# Patient Record
Sex: MALE | Race: WHITE | NOT HISPANIC OR LATINO | Employment: OTHER | URBAN - METROPOLITAN AREA
[De-identification: names, ages, dates, MRNs, and addresses within clinical notes are randomized per-mention and may not be internally consistent; named-entity substitution may affect disease eponyms.]

---

## 2024-08-31 ENCOUNTER — OFFICE VISIT (OUTPATIENT)
Age: 34
End: 2024-08-31
Payer: COMMERCIAL

## 2024-08-31 VITALS
HEART RATE: 61 BPM | WEIGHT: 196 LBS | SYSTOLIC BLOOD PRESSURE: 122 MMHG | HEIGHT: 69 IN | TEMPERATURE: 97.9 F | BODY MASS INDEX: 29.03 KG/M2 | DIASTOLIC BLOOD PRESSURE: 81 MMHG | RESPIRATION RATE: 18 BRPM | OXYGEN SATURATION: 97 %

## 2024-08-31 DIAGNOSIS — B96.89 ACUTE BACTERIAL RHINOSINUSITIS: Primary | ICD-10-CM

## 2024-08-31 DIAGNOSIS — J01.90 ACUTE BACTERIAL RHINOSINUSITIS: Primary | ICD-10-CM

## 2024-08-31 DIAGNOSIS — H10.33 ACUTE BACTERIAL CONJUNCTIVITIS OF BOTH EYES: ICD-10-CM

## 2024-08-31 DIAGNOSIS — R68.89 FLU-LIKE SYMPTOMS: ICD-10-CM

## 2024-08-31 PROBLEM — S62.627B: Status: ACTIVE | Noted: 2024-01-03

## 2024-08-31 LAB
SARS-COV-2 AG UPPER RESP QL IA: NEGATIVE
VALID CONTROL: NORMAL

## 2024-08-31 PROCEDURE — G0382 LEV 3 HOSP TYPE B ED VISIT: HCPCS

## 2024-08-31 PROCEDURE — 87811 SARS-COV-2 COVID19 W/OPTIC: CPT

## 2024-08-31 RX ORDER — OFLOXACIN 3 MG/ML
2 SOLUTION/ DROPS OPHTHALMIC 4 TIMES DAILY
Qty: 5 ML | Refills: 0 | Status: SHIPPED | OUTPATIENT
Start: 2024-08-31

## 2024-08-31 RX ORDER — FLUTICASONE PROPIONATE 50 MCG
1 SPRAY, SUSPENSION (ML) NASAL DAILY
Qty: 9.9 ML | Refills: 0 | Status: SHIPPED | OUTPATIENT
Start: 2024-08-31

## 2024-08-31 RX ORDER — BROMPHENIRAMINE MALEATE, PSEUDOEPHEDRINE HYDROCHLORIDE, AND DEXTROMETHORPHAN HYDROBROMIDE 2; 30; 10 MG/5ML; MG/5ML; MG/5ML
5 SYRUP ORAL 4 TIMES DAILY PRN
Qty: 120 ML | Refills: 0 | Status: SHIPPED | OUTPATIENT
Start: 2024-08-31

## 2024-08-31 NOTE — PROGRESS NOTES
[facility]        NAME: Gavin Weber is a 33 y.o. male  : 1990    MRN: [id number]  DATE: 2024  TIME: 10:46 AM    Assessment and Plan   Acute bacterial rhinosinusitis [J01.90, B96.89]  1. Acute bacterial rhinosinusitis  amoxicillin-clavulanate (AUGMENTIN) 875-125 mg per tablet      2. Flu-like symptoms  Poct Covid 19 Rapid Antigen Test    fluticasone (FLONASE) 50 mcg/act nasal spray      3. Acute bacterial conjunctivitis of both eyes  ofloxacin (OCUFLOX) 0.3 % ophthalmic solution        Rapid COVID test negative. Patient declined Strep testing. Antibiotics as directed for presumed bacterial infection. Bromphed and flonase as directed for cough/congestion. Ocuflox for conjunctivitis. VSS in clinic, appears in no acute distress. Educated on use of OTC products for additional relief of symptoms. Advised close follow-up with PCP or to report to the ER if symptoms worsen. Patient verbalizes understanding and agreeable to plan.       Patient Instructions     Take antibiotic as directed for next week. Complete entire course of therapy even if symptoms improve and take medication with food to avoid upset stomach. Use flonase with nasal saline up to twice daily for congestion and take bromphed as directed for cough.    Apply drops to affected eye(s) as directed the next 7 days. Apply warm compresses after administering drop for additional relief of symptoms. Wash hands frequently and avoid touching eyes. Change bed linen after 24 hours of using drops. May use oral (zyrtec, benadryl, claritin) or nasal (flonase) decongestants as needed for congestion. Follow-up with PCP in 3-5 days if no improvement of symptoms. Report to ER if symptoms worsen.       Chief Complaint     Chief Complaint   Patient presents with    Cold Like Symptoms     Started 6 days ago, patient complains of cough, congestion, headache, discharge of left eye.          History of Present Illness       33 year old male presents  for evaluation of congestion for the past 6 days. He relates he was around family members last week with similar symptoms, whose symptoms went away, but his have persisted. He denies h/o asthma or allergies. He denies any known fevers. He reports a mild cough with occasional productive white mucous. He denies SOB. He relates he originally had a sore throat but that has since resolved. He also reports waking up the past few mornings with green/yellow crusting discharge from both eyes. He denies associated visual disturbances, headaches, or dizziness. He has been taking dayquil, nyquil, and mucinex for symptoms with minimal relief.     Sinusitis  This is a new problem. The current episode started in the past 7 days. The problem is unchanged. There has been no fever. The pain is moderate. Associated symptoms include congestion, coughing, sinus pressure and a sore throat. Pertinent negatives include no chills, diaphoresis, ear pain, headaches, hoarse voice, neck pain, shortness of breath, sneezing or swollen glands. Past treatments include oral decongestants. The treatment provided mild relief.   Conjunctivitis   The current episode started 2 days ago. The onset was sudden. The problem has been gradually worsening. The problem is mild. Nothing relieves the symptoms. Nothing aggravates the symptoms. Associated symptoms include eye itching, congestion, rhinorrhea, sore throat, cough, URI, eye discharge, eye pain and eye redness. Pertinent negatives include no orthopnea, no fever, no decreased vision, no double vision, no photophobia, no abdominal pain, no constipation, no diarrhea, no nausea, no vomiting, no ear discharge, no ear pain, no headaches, no hearing loss, no mouth sores, no stridor, no swollen glands, no muscle aches, no neck pain, no neck stiffness, no wheezing and no rash. The eye pain is mild. Both eyes are affected. The eye pain is not associated with movement. The eyelid exhibits swelling. He has been  Behaving normally. He has been Eating and drinking normally. Urine output has been normal. The last void occurred Less than 6 hours ago. There were sick contacts at home. He has received no recent medical care.       Review of Systems   Review of Systems   Constitutional:  Positive for activity change. Negative for appetite change, chills, diaphoresis, fatigue and fever.   HENT:  Positive for congestion, postnasal drip, rhinorrhea, sinus pressure and sore throat. Negative for ear discharge, ear pain, hearing loss, hoarse voice, mouth sores, sinus pain, sneezing and trouble swallowing.    Eyes:  Positive for pain, discharge, redness and itching. Negative for double vision, photophobia and visual disturbance.   Respiratory:  Positive for cough. Negative for chest tightness, shortness of breath, wheezing and stridor.    Cardiovascular:  Negative for chest pain and orthopnea.   Gastrointestinal:  Negative for abdominal pain, constipation, diarrhea, nausea and vomiting.   Musculoskeletal:  Negative for arthralgias, back pain, myalgias and neck pain.   Skin:  Negative for color change, pallor and rash.   Allergic/Immunologic: Negative for environmental allergies and food allergies.   Neurological:  Negative for dizziness, light-headedness and headaches.         Current Medications       Current Outpatient Medications:     amoxicillin-clavulanate (AUGMENTIN) 875-125 mg per tablet, Take 1 tablet by mouth every 12 (twelve) hours for 7 days, Disp: 14 tablet, Rfl: 0    fluticasone (FLONASE) 50 mcg/act nasal spray, 1 spray into each nostril daily, Disp: 9.9 mL, Rfl: 0    ofloxacin (OCUFLOX) 0.3 % ophthalmic solution, Administer 2 drops to both eyes 4 (four) times a day, Disp: 5 mL, Rfl: 0    Current Allergies     Allergies as of 08/31/2024 - Reviewed 08/31/2024   Allergen Reaction Noted    Meloxicam Itching 08/31/2024            The following portions of the patient's history were reviewed and updated as appropriate: allergies,  "current medications, past family history, past medical history, past social history, past surgical history and problem list.     History reviewed. No pertinent past medical history.    History reviewed. No pertinent surgical history.    No family history on file.      Medications have been verified.        Objective   /81   Pulse 61   Temp 97.9 °F (36.6 °C)   Resp 18   Ht 5' 9\" (1.753 m)   Wt 88.9 kg (196 lb)   SpO2 97%   BMI 28.94 kg/m²        Physical Exam     Physical Exam  Vitals and nursing note reviewed.   Constitutional:       General: He is awake. He is not in acute distress.     Appearance: Normal appearance. He is well-developed and overweight.   HENT:      Head: Normocephalic and atraumatic.      Right Ear: Hearing, tympanic membrane, ear canal and external ear normal.      Left Ear: Hearing, tympanic membrane, ear canal and external ear normal.      Nose: Congestion and rhinorrhea present.      Mouth/Throat:      Lips: Pink.      Mouth: Mucous membranes are moist.      Pharynx: Oropharynx is clear. Uvula midline. Posterior oropharyngeal erythema and postnasal drip present. No pharyngeal swelling, oropharyngeal exudate or uvula swelling.      Tonsils: No tonsillar exudate or tonsillar abscesses. 2+ on the right. 2+ on the left.   Eyes:      General:         Right eye: Discharge present.         Left eye: Discharge present.     Conjunctiva/sclera:      Right eye: Right conjunctiva is injected.      Left eye: Left conjunctiva is injected.      Comments: Yellow/crusty eye discharge per patient, not present on exam. Scelera red in both eyes, mild surrounding eyelid swelling of upper eyelids.   Cardiovascular:      Rate and Rhythm: Normal rate and regular rhythm.      Pulses: Normal pulses.      Heart sounds: Normal heart sounds.   Pulmonary:      Effort: Pulmonary effort is normal.      Breath sounds: Normal breath sounds.   Musculoskeletal:      Cervical back: Full passive range of motion " without pain, normal range of motion and neck supple.   Lymphadenopathy:      Cervical: No cervical adenopathy.   Skin:     General: Skin is warm and dry.   Neurological:      General: No focal deficit present.      Mental Status: He is alert and oriented to person, place, and time.   Psychiatric:         Mood and Affect: Mood normal.         Behavior: Behavior normal. Behavior is cooperative.         Thought Content: Thought content normal.         Judgment: Judgment normal.

## 2024-08-31 NOTE — PATIENT INSTRUCTIONS
Take antibiotic as directed for next week. Complete entire course of therapy even if symptoms improve and take medication with food to avoid upset stomach. Use flonase with nasal saline up to twice daily for congestion and take bromphed as directed for cough.    Apply drops to affected eye(s) as directed the next 7 days. Apply warm compresses after administering drop for additional relief of symptoms. Wash hands frequently and avoid touching eyes. Change bed linen after 24 hours of using drops. May use oral (zyrtec, benadryl, claritin) or nasal (flonase) decongestants as needed for congestion. Follow-up with PCP in 3-5 days if no improvement of symptoms. Report to ER if symptoms worsen.